# Patient Record
Sex: FEMALE | Race: WHITE | NOT HISPANIC OR LATINO | Employment: OTHER | URBAN - METROPOLITAN AREA
[De-identification: names, ages, dates, MRNs, and addresses within clinical notes are randomized per-mention and may not be internally consistent; named-entity substitution may affect disease eponyms.]

---

## 2022-06-24 ENCOUNTER — HOSPITAL ENCOUNTER (OUTPATIENT)
Facility: CLINIC | Age: 67
Setting detail: OBSERVATION
Discharge: OTHER INSTITUTION | End: 2022-06-24
Attending: EMERGENCY MEDICINE | Admitting: FAMILY MEDICINE
Payer: MEDICARE

## 2022-06-24 ENCOUNTER — APPOINTMENT (OUTPATIENT)
Dept: RADIOLOGY | Facility: CLINIC | Age: 67
End: 2022-06-24
Attending: EMERGENCY MEDICINE
Payer: MEDICARE

## 2022-06-24 VITALS
RESPIRATION RATE: 25 BRPM | BODY MASS INDEX: 31.58 KG/M2 | WEIGHT: 185 LBS | HEART RATE: 73 BPM | HEIGHT: 64 IN | OXYGEN SATURATION: 95 % | TEMPERATURE: 98 F | SYSTOLIC BLOOD PRESSURE: 127 MMHG | DIASTOLIC BLOOD PRESSURE: 68 MMHG

## 2022-06-24 DIAGNOSIS — R79.89 ELEVATED TROPONIN: ICD-10-CM

## 2022-06-24 DIAGNOSIS — R07.9 CHEST PAIN, UNSPECIFIED TYPE: ICD-10-CM

## 2022-06-24 DIAGNOSIS — L50.9 HIVES: ICD-10-CM

## 2022-06-24 LAB
ALBUMIN SERPL-MCNC: 3.8 G/DL (ref 3.5–5)
ALP SERPL-CCNC: 68 U/L (ref 45–120)
ALT SERPL W P-5'-P-CCNC: 30 U/L (ref 0–45)
ANION GAP SERPL CALCULATED.3IONS-SCNC: 10 MMOL/L (ref 5–18)
AST SERPL W P-5'-P-CCNC: 20 U/L (ref 0–40)
ATRIAL RATE - MUSE: 76 BPM
BASOPHILS # BLD AUTO: 0.1 10E3/UL (ref 0–0.2)
BASOPHILS NFR BLD AUTO: 1 %
BILIRUB SERPL-MCNC: 0.4 MG/DL (ref 0–1)
BUN SERPL-MCNC: 11 MG/DL (ref 8–22)
CALCIUM SERPL-MCNC: 9.7 MG/DL (ref 8.5–10.5)
CHLORIDE BLD-SCNC: 108 MMOL/L (ref 98–107)
CO2 SERPL-SCNC: 23 MMOL/L (ref 22–31)
CREAT SERPL-MCNC: 0.71 MG/DL (ref 0.6–1.1)
DIASTOLIC BLOOD PRESSURE - MUSE: 86 MMHG
EOSINOPHIL # BLD AUTO: 0.1 10E3/UL (ref 0–0.7)
EOSINOPHIL NFR BLD AUTO: 2 %
ERYTHROCYTE [DISTWIDTH] IN BLOOD BY AUTOMATED COUNT: 12.8 % (ref 10–15)
GFR SERPL CREATININE-BSD FRML MDRD: >90 ML/MIN/1.73M2
GLUCOSE BLD-MCNC: 87 MG/DL (ref 70–125)
HCT VFR BLD AUTO: 42.3 % (ref 35–47)
HGB BLD-MCNC: 14.2 G/DL (ref 11.7–15.7)
HOLD SPECIMEN: NORMAL
HOLD SPECIMEN: NORMAL
IMM GRANULOCYTES # BLD: 0 10E3/UL
IMM GRANULOCYTES NFR BLD: 0 %
INTERPRETATION ECG - MUSE: NORMAL
LYMPHOCYTES # BLD AUTO: 2 10E3/UL (ref 0.8–5.3)
LYMPHOCYTES NFR BLD AUTO: 23 %
MCH RBC QN AUTO: 33.6 PG (ref 26.5–33)
MCHC RBC AUTO-ENTMCNC: 33.6 G/DL (ref 31.5–36.5)
MCV RBC AUTO: 100 FL (ref 78–100)
MONOCYTES # BLD AUTO: 0.8 10E3/UL (ref 0–1.3)
MONOCYTES NFR BLD AUTO: 9 %
NEUTROPHILS # BLD AUTO: 5.8 10E3/UL (ref 1.6–8.3)
NEUTROPHILS NFR BLD AUTO: 65 %
NRBC # BLD AUTO: 0 10E3/UL
NRBC BLD AUTO-RTO: 0 /100
P AXIS - MUSE: 45 DEGREES
PLATELET # BLD AUTO: 372 10E3/UL (ref 150–450)
POTASSIUM BLD-SCNC: 4.1 MMOL/L (ref 3.5–5)
PR INTERVAL - MUSE: 182 MS
PROT SERPL-MCNC: 6.9 G/DL (ref 6–8)
QRS DURATION - MUSE: 98 MS
QT - MUSE: 392 MS
QTC - MUSE: 441 MS
R AXIS - MUSE: -17 DEGREES
RBC # BLD AUTO: 4.23 10E6/UL (ref 3.8–5.2)
SARS-COV-2 RNA RESP QL NAA+PROBE: NEGATIVE
SODIUM SERPL-SCNC: 141 MMOL/L (ref 136–145)
SYSTOLIC BLOOD PRESSURE - MUSE: 166 MMHG
T AXIS - MUSE: 59 DEGREES
TROPONIN I SERPL-MCNC: 0.53 NG/ML (ref 0–0.29)
TROPONIN I SERPL-MCNC: <0.01 NG/ML (ref 0–0.29)
UFH PPP CHRO-ACNC: 0.26 IU/ML
VENTRICULAR RATE- MUSE: 76 BPM
WBC # BLD AUTO: 8.8 10E3/UL (ref 4–11)

## 2022-06-24 PROCEDURE — 71046 X-RAY EXAM CHEST 2 VIEWS: CPT

## 2022-06-24 PROCEDURE — 250N000011 HC RX IP 250 OP 636: Performed by: EMERGENCY MEDICINE

## 2022-06-24 PROCEDURE — U0005 INFEC AGEN DETEC AMPLI PROBE: HCPCS | Performed by: EMERGENCY MEDICINE

## 2022-06-24 PROCEDURE — 250N000013 HC RX MED GY IP 250 OP 250 PS 637: Performed by: EMERGENCY MEDICINE

## 2022-06-24 PROCEDURE — 96365 THER/PROPH/DIAG IV INF INIT: CPT

## 2022-06-24 PROCEDURE — 85025 COMPLETE CBC W/AUTO DIFF WBC: CPT | Performed by: EMERGENCY MEDICINE

## 2022-06-24 PROCEDURE — 36415 COLL VENOUS BLD VENIPUNCTURE: CPT | Performed by: EMERGENCY MEDICINE

## 2022-06-24 PROCEDURE — 96366 THER/PROPH/DIAG IV INF ADDON: CPT

## 2022-06-24 PROCEDURE — 99285 EMERGENCY DEPT VISIT HI MDM: CPT | Mod: 25

## 2022-06-24 PROCEDURE — G0378 HOSPITAL OBSERVATION PER HR: HCPCS

## 2022-06-24 PROCEDURE — 96375 TX/PRO/DX INJ NEW DRUG ADDON: CPT

## 2022-06-24 PROCEDURE — 80053 COMPREHEN METABOLIC PANEL: CPT | Performed by: EMERGENCY MEDICINE

## 2022-06-24 PROCEDURE — 85520 HEPARIN ASSAY: CPT | Performed by: EMERGENCY MEDICINE

## 2022-06-24 PROCEDURE — C9803 HOPD COVID-19 SPEC COLLECT: HCPCS

## 2022-06-24 PROCEDURE — 84484 ASSAY OF TROPONIN QUANT: CPT | Performed by: EMERGENCY MEDICINE

## 2022-06-24 PROCEDURE — 93005 ELECTROCARDIOGRAM TRACING: CPT | Performed by: EMERGENCY MEDICINE

## 2022-06-24 RX ORDER — MONTELUKAST SODIUM 10 MG/1
10 TABLET ORAL AT BEDTIME
COMMUNITY

## 2022-06-24 RX ORDER — CYCLOSPORINE 0.5 MG/ML
1 EMULSION OPHTHALMIC 2 TIMES DAILY
COMMUNITY
Start: 2021-12-07

## 2022-06-24 RX ORDER — FLUTICASONE PROPIONATE AND SALMETEROL 50; 250 UG/1; UG/1
1 POWDER RESPIRATORY (INHALATION) DAILY
COMMUNITY
Start: 2022-05-23

## 2022-06-24 RX ORDER — ASPIRIN 81 MG/1
162 TABLET, CHEWABLE ORAL ONCE
Status: COMPLETED | OUTPATIENT
Start: 2022-06-24 | End: 2022-06-24

## 2022-06-24 RX ORDER — DILTIAZEM HYDROCHLORIDE 120 MG/1
120 CAPSULE, EXTENDED RELEASE ORAL DAILY
COMMUNITY

## 2022-06-24 RX ORDER — IBUPROFEN 200 MG
200 TABLET ORAL EVERY 6 HOURS
COMMUNITY
End: 2022-06-24

## 2022-06-24 RX ORDER — DIPHENHYDRAMINE HYDROCHLORIDE 50 MG/ML
25 INJECTION INTRAMUSCULAR; INTRAVENOUS ONCE
Status: COMPLETED | OUTPATIENT
Start: 2022-06-24 | End: 2022-06-24

## 2022-06-24 RX ORDER — FLECAINIDE ACETATE 50 MG/1
50 TABLET ORAL 2 TIMES DAILY
COMMUNITY

## 2022-06-24 RX ORDER — ALBUTEROL SULFATE 90 UG/1
2 AEROSOL, METERED RESPIRATORY (INHALATION) EVERY 4 HOURS PRN
COMMUNITY

## 2022-06-24 RX ORDER — HEPARIN SODIUM 10000 [USP'U]/100ML
0-5000 INJECTION, SOLUTION INTRAVENOUS CONTINUOUS
Status: DISCONTINUED | OUTPATIENT
Start: 2022-06-24 | End: 2022-06-24 | Stop reason: HOSPADM

## 2022-06-24 RX ORDER — NITROGLYCERIN 0.4 MG/1
0.4 TABLET SUBLINGUAL EVERY 5 MIN PRN
Status: DISCONTINUED | OUTPATIENT
Start: 2022-06-24 | End: 2022-06-24 | Stop reason: HOSPADM

## 2022-06-24 RX ADMIN — HEPARIN SODIUM 1000 UNITS/HR: 10000 INJECTION, SOLUTION INTRAVENOUS at 12:09

## 2022-06-24 RX ADMIN — DIPHENHYDRAMINE HYDROCHLORIDE 25 MG: 50 INJECTION, SOLUTION INTRAMUSCULAR; INTRAVENOUS at 03:14

## 2022-06-24 RX ADMIN — ASPIRIN 81 MG CHEWABLE TABLET 162 MG: 81 TABLET CHEWABLE at 03:13

## 2022-06-24 ASSESSMENT — ENCOUNTER SYMPTOMS
NAUSEA: 0
SHORTNESS OF BREATH: 1
VOMITING: 0

## 2022-06-24 NOTE — ED NOTES
EMERGENCY DEPARTMENT SIGN OUT NOTE        ED COURSE AND MEDICAL DECISION MAKING  The 66-year-old female who presented to the ED for evaluation of chest pain/heaviness and urticaria was checked out to me by Dr. Monroy.  Initial laboratory tests, EKG, chest x-ray were all reassuring.  The patient was given Benadryl and nitroglycerin.  At the time of checkout there was a 3-hour repeat troponin still pending.    I received a call from the lab stating that the patient's troponin was elevated at 0.53.    The patient was then reevaluated.  The patient denies any chest pain or shortness of breath at reevaluation.  The patient was informed of the likely NSTEMI and the need for admission for further work-up.    The patient's case wasdiscussed with the on-call cardiologist who was able to find a cardiac catheterization report from 2016 in [place].  The patient's coronary arteries were clear at that time.  Cardiologist did not feel that the patient would definitively need to return back to the Cath Lab with this hospitalization unless her chest pain starts to worsen or her troponins continue to substantially increase.  The patient was then admitted to the hospitalist service for further evaluation.    The patient then requested to be transferred to [facility].  The patient's case was discussed with the on-call cardiologist Dr. Pérez who accepted the transfer.  The patient was started on heparin.  The patient's care was turned over to Dr. Dan while awaiting transfer.        Patient was signed out to me by Dr Smooth Monroy at 6:47 AM.  7:46 AM I updated the patient on her lab results.   8:52 AM I spoke to Dr. Lima, Cardiology.   10:08 AM I discussed the case with hospitalist, Dr Hewitt, who accepts the patient.   11:45 AM I spoke to Dr. Pérez, [facility].    In brief, Himanshu Solo is a 66 year old female who initially presented for evaluation of hives and chest pain. The patient reports she woke up around 0130  this morning and noticed itchy hives on her chest and upper abdomen. She took some Claritin and then started to feel heaviness in her chest, pain in her left arm, tingling in her jaw, and some mild shortness of breath. The heaviness, pain, and tingling have been on and off since onset and she is not experiencing them currently.    At time of sign out, disposition was pending troponin lab.     FINAL IMPRESSION    1. Chest pain, unspecified type    2. Hives    3. Elevated troponin        ED MEDS  Medications   nitroGLYcerin (NITROSTAT) sublingual tablet 0.4 mg (has no administration in time range)   heparin 25,000 units in 0.45% NaCl 250 mL ANTICOAGULANT infusion (has no administration in time range)   diphenhydrAMINE (BENADRYL) injection 25 mg (25 mg Intravenous Given 6/24/22 0314)   aspirin (ASA) chewable tablet 162 mg (162 mg Oral Given 6/24/22 0313)       LAB  Labs Ordered and Resulted from Time of ED Arrival to Time of ED Departure   COMPREHENSIVE METABOLIC PANEL - Abnormal       Result Value    Sodium 141      Potassium 4.1      Chloride 108 (*)     Carbon Dioxide (CO2) 23      Anion Gap 10      Urea Nitrogen 11      Creatinine 0.71      Calcium 9.7      Glucose 87      Alkaline Phosphatase 68      AST 20      ALT 30      Protein Total 6.9      Albumin 3.8      Bilirubin Total 0.4      GFR Estimate >90     CBC WITH PLATELETS AND DIFFERENTIAL - Abnormal    WBC Count 8.8      RBC Count 4.23      Hemoglobin 14.2      Hematocrit 42.3            MCH 33.6 (*)     MCHC 33.6      RDW 12.8      Platelet Count 372      % Neutrophils 65      % Lymphocytes 23      % Monocytes 9      % Eosinophils 2      % Basophils 1      % Immature Granulocytes 0      NRBCs per 100 WBC 0      Absolute Neutrophils 5.8      Absolute Lymphocytes 2.0      Absolute Monocytes 0.8      Absolute Eosinophils 0.1      Absolute Basophils 0.1      Absolute Immature Granulocytes 0.0      Absolute NRBCs 0.0     TROPONIN I - Abnormal    Troponin I  0.53 (*)    TROPONIN I - Normal    Troponin I <0.01     COVID-19 VIRUS (CORONAVIRUS) BY PCR - Normal    SARS CoV2 PCR Negative     HEPARIN UNFRACTIONATED ANTI XA LEVEL         RADIOLOGY    XR Chest 2 Views   Final Result   IMPRESSION: Both lungs are clear. No adenopathy or effusion. Normal cardiac size and pulmonary vascularity. Mild degenerative changes both shoulders and the spine. No previous study available for comparison. Current study will serve as a baseline for    future follow-up.          I, Carmela Ulloa , am serving as a scribe to document services personally performed by Maritza Miller DO based on my observation and the provider's statements to me. I, Maritza Miller, attest that Carmela Ulloa is acting in a scribe capacity, has observed my performance of the services and has documented them in accordance with my direction.    Maritza Miller DO  Emergency Medicine  [facility] EMERGENCY ROOM  [address] [id number] [phone]     Maritza Miller DO  06/24/22 3020

## 2022-06-24 NOTE — ED PROVIDER NOTES
EMERGENCY DEPARTMENT ENCOUNTER      NAME: Himanshu Solo  AGE: 66 year old female  YOB: 1955  MRN: [id number]  EVALUATION DATE & TIME: 6/24/2022  2:38 AM    PCP: Taisha Mendenhall    ED PROVIDER: Smooth Monroy M.D.      Chief Complaint   Patient presents with     Allergic Reaction     Hives         FINAL IMPRESSION:  No diagnosis found.      ED COURSE & MEDICAL DECISION MAKING:    Pertinent Labs & Imaging studies reviewed. (See chart for details)  66 year old female presents to the Emergency Department for evaluation of hives and chest pain.  Patient developed hives over her right flank.  This symptom came on somewhat suddenly.  After it came on she is also developed left-sided chest pain.  This did radiate up into her jaw.  Somewhat concerning for possible cardiac source.  EKG is normal.  Chest x-ray is clear.  Labs otherwise unremarkable.  Did give the patient a dose of Benadryl which did help with the hives.  Also received aspirin and nitro which helped with the chest pain.  Patient will get a second troponin.  If negative likely discharge home.  She is low risk for cardiac disease.  We will have her follow-up with cardiology.    2:49 AM I met with the patient to gather history and to perform my initial exam. I discussed the plan for care while in the Emergency Department. PPE: surgical mask, protective eyewear, gloves    At the conclusion of the encounter I discussed the results of all of the tests and the disposition. The questions were answered. The patient or family acknowledged understanding and was agreeable with the care plan.       MEDICATIONS GIVEN IN THE EMERGENCY:  Medications   diphenhydrAMINE (BENADRYL) injection 25 mg (has no administration in time range)   aspirin (ASA) chewable tablet 162 mg (has no administration in time range)   nitroGLYcerin (NITROSTAT) sublingual tablet 0.4 mg (has no administration in time range)       NEW PRESCRIPTIONS STARTED AT TODAY'S ER VISIT  New Prescriptions     No medications on file          =================================================================    HPI    Patient information was obtained from: Patient    Use of : N/A       Himanshu Solo is a 66 year old female with a pertinent history of aortic valve stenosis, edema, HTN, GERD, hypothyroidism, asthma, mitral regurgitation, hypercholesterolemia, PVCs, and obesity who presents to this ED by walk in with her son for evaluation of hives and chest pain.    The patient reports she woke up around 0130 this morning and noticed itchy hives on her chest and upper abdomen. She got up and took some Claritin and then she started to feel heaviness in her chest, pain in her left arm, tingling in her jaw, and some mild shortness of breath. The heaviness, pain, and tingling have been on and off since onset. She is not experiencing them currently. She has not had any nausea or vomiting. Her symptoms feel different than when she has PVCs. She felt fine before she went to bed and did not partake in any new activities yesterday or start any new medications recently.    Denies any other complaints at this time.    REVIEW OF SYSTEMS   Review of Systems   Respiratory: Positive for shortness of breath.    Cardiovascular: Positive for chest pain (heaviness).   Gastrointestinal: Negative for nausea and vomiting.   Musculoskeletal:        Positive for left arm pain  Positive for jaw tingling   All other systems reviewed and are negative.      PAST MEDICAL HISTORY:  History reviewed. No pertinent past medical history.    PAST SURGICAL HISTORY:  History reviewed. No pertinent surgical history.        CURRENT MEDICATIONS:    Current Facility-Administered Medications   Medication     aspirin (ASA) chewable tablet 162 mg     diphenhydrAMINE (BENADRYL) injection 25 mg     nitroGLYcerin (NITROSTAT) sublingual tablet 0.4 mg     Current Outpatient Medications   Medication     Calcium Citrate-Vitamin D (CITRACAL/VITAMIN D PO)      "darifenacin (ENABLEX) 15 MG 24 hr tablet     darifenacin (ENABLEX) 7.5 MG 24 hr tablet     darifenacin (ENABLEX) 7.5 MG 24 hr tablet     darifenacin (ENABLEX) 7.5 MG 24 hr tablet     darifenacin (ENABLEX) 7.5 MG 24 hr tablet     DILTIAZEM HCL     Levothyroxine Sodium (SYNTHROID PO)     mirabegron (MYRBETRIQ) 25 MG 24 hr tablet     mometasone-formoterol (DULERA) 100-5 MCG/ACT oral inhaler     Multiple Vitamin (MULTI-VITAMIN PO)     Omeprazole Magnesium (PRILOSEC OTC PO)     spironolactone (ALDACTONE) 25 MG tablet         ALLERGIES:  Allergies   Allergen Reactions     Sulfa Drugs      Hives         FAMILY HISTORY:  History reviewed. No pertinent family history.    SOCIAL HISTORY:   Social History     Socioeconomic History     Marital status:        VITALS:  BP (!) 176/83   Pulse 81   Temp 98  F (36.7  C) (Oral)   Resp 18   Ht 1.626 m (5' 4\")   Wt 83.9 kg (185 lb)   BMI 31.76 kg/m      PHYSICAL EXAM    Physical Exam  Constitutional:       General: She is not in acute distress.     Appearance: She is not diaphoretic.   HENT:      Head: Atraumatic.      Mouth/Throat:      Pharynx: No oropharyngeal exudate.   Eyes:      General: No scleral icterus.     Pupils: Pupils are equal, round, and reactive to light.   Cardiovascular:      Heart sounds: Normal heart sounds.   Pulmonary:      Effort: No respiratory distress.      Breath sounds: Normal breath sounds.   Abdominal:      Palpations: Abdomen is soft.      Tenderness: There is no abdominal tenderness. There is no guarding or rebound.   Musculoskeletal:         General: No tenderness.   Skin:     General: Skin is warm.      Findings: Rash ( Raised urticarial rash over right flank.) present.   Neurological:      General: No focal deficit present.      Mental Status: She is alert.           LAB:  All pertinent labs reviewed and interpreted.  Labs Ordered and Resulted from Time of ED Arrival to Time of ED Departure - No data to display    RADIOLOGY:  Reviewed all " pertinent imaging. Please see official radiology report.  XR Chest 2 Views    (Results Pending)       EKG:    Performed at: 304  Impression: Normal EKG.  Unchanged from previous dated November 1, 2006  Sinus rhythm intergrade of 76.  .  QRS 98.  QTc 441    I have independently reviewed and interpreted the EKG(s) documented above.        I, Moustapha Talbert, am serving as a scribe to document services personally performed by Dr. Smooth Monroy, based on my observation and the provider's statements to me. I, Smooth Monroy MD attest that Moustapha Talbert is acting in a scribe capacity, has observed my performance of the services and has documented them in accordance with my direction.    Smooth Monroy M.D.  Emergency Medicine  [facility] EMERGENCY ROOM  [address] [phone]  Dept: [phone]       Smooth Monroy MD  06/24/22 0624

## 2022-06-24 NOTE — PHARMACY-ADMISSION MEDICATION HISTORY
Pharmacy Note - Admission Medication History    Pertinent Provider Information:     ______________________________________________________________________    Prior To Admission (PTA) med list completed and updated in EMR.       PTA Med List   Medication Sig Last Dose     ADVAIR DISKUS 250-50 MCG/ACT inhaler Inhale 1 puff into the lungs daily 6/23/2022 at pt own supplyt     albuterol (PROAIR HFA/PROVENTIL HFA/VENTOLIN HFA) 108 (90 Base) MCG/ACT inhaler 2 puffs every 4 hours as needed Unknown at Unknown time     diltiazem ER (DILT-XR) 120 MG 24 hr capsule Take 120 mg by mouth daily 6/23/2022 at Unknown time     flecainide (TAMBOCOR) 50 MG tablet Take 50 mg by mouth 2 times daily 6/23/2022 at Unknown time     levothyroxine (SYNTHROID/LEVOTHROID) 88 MCG tablet Take 88 mcg by mouth daily 6/23/2022 at Unknown time     montelukast (SINGULAIR) 10 MG tablet 10 mg At Bedtime 6/23/2022 at Unknown time     Multiple Vitamin (MULTI-VITAMIN PO) Take 1 tablet by mouth daily She takes on and off Past Month at Unknown time     Omeprazole Magnesium (PRILOSEC OTC PO) Take 20 mg by mouth daily 6/23/2022 at Unknown time     RESTASIS 0.05 % ophthalmic emulsion Place 1 drop into both eyes 2 times daily 6/23/2022 at pt own supply     spironolactone (ALDACTONE) 25 MG tablet Take  by mouth daily. 6/23/2022 at Unknown time       Information source(s): Patient  Method of interview communication: in-person    Summary of Changes to PTA Med List  New: Flecainide, Advair  Discontinued:  Enablex, Calcium w/D, Myrbetriq, Dulera   Changed:     Patient was asked about OTC/herbal products specifically.  PTA med list reflects this.    In the past week, patient estimated taking medication this percent of the time:  greater than 90%.    Allergies were reviewed, assessed, and updated with the patient.      Medications currently not available for use during hospital stay. Family/Patient representative states they will bring Inhalers, Restasis eye drop  to  [facility].    The information provided in this note is only as accurate as the sources available at the time of the update(s).    Thank you for the opportunity to participate in the care of this patient.    Marily Whyte PharmD  6/24/2022 11:27 AM

## 2022-06-24 NOTE — ED TRIAGE NOTES
Pt arrives to ER with son. Pt reports chest heaviness radiating to left arm and jaw. Radiating pain reported as a burning sensation. She also reports having hives that came on early this morning.      Triage Assessment     Row Name 06/24/22 0249       Triage Assessment (Adult)    Airway WDL WDL       Respiratory WDL    Respiratory WDL X  SOB       Skin Circulation/Temperature WDL    Skin Circulation/Temperature WDL WDL       Cardiac WDL    Cardiac WDL X  chest heaviness       Peripheral/Neurovascular WDL    Peripheral Neurovascular WDL WDL       Cognitive/Neuro/Behavioral WDL    Cognitive/Neuro/Behavioral WDL WDL

## 2022-08-06 ENCOUNTER — HEALTH MAINTENANCE LETTER (OUTPATIENT)
Age: 67
End: 2022-08-06

## 2022-10-22 ENCOUNTER — HEALTH MAINTENANCE LETTER (OUTPATIENT)
Age: 67
End: 2022-10-22

## 2023-08-26 ENCOUNTER — HEALTH MAINTENANCE LETTER (OUTPATIENT)
Age: 68
End: 2023-08-26

## 2024-08-10 ENCOUNTER — HEALTH MAINTENANCE LETTER (OUTPATIENT)
Age: 69
End: 2024-08-10

## 2024-10-19 ENCOUNTER — HEALTH MAINTENANCE LETTER (OUTPATIENT)
Age: 69
End: 2024-10-19